# Patient Record
Sex: MALE | Race: WHITE | NOT HISPANIC OR LATINO | Employment: FULL TIME | URBAN - METROPOLITAN AREA
[De-identification: names, ages, dates, MRNs, and addresses within clinical notes are randomized per-mention and may not be internally consistent; named-entity substitution may affect disease eponyms.]

---

## 2017-06-22 ENCOUNTER — APPOINTMENT (OUTPATIENT)
Dept: LAB | Facility: HOSPITAL | Age: 37
End: 2017-06-22

## 2017-06-22 PROCEDURE — 80053 COMPREHEN METABOLIC PANEL: CPT | Performed by: NURSE PRACTITIONER

## 2017-06-22 PROCEDURE — 84443 ASSAY THYROID STIM HORMONE: CPT | Performed by: NURSE PRACTITIONER

## 2017-06-29 ENCOUNTER — LAB (OUTPATIENT)
Dept: LAB | Facility: HOSPITAL | Age: 37
End: 2017-06-29

## 2017-06-29 ENCOUNTER — TRANSCRIBE ORDERS (OUTPATIENT)
Dept: LAB | Facility: HOSPITAL | Age: 37
End: 2017-06-29

## 2017-06-29 DIAGNOSIS — Z51.81 ENCOUNTER FOR THERAPEUTIC DRUG MONITORING: ICD-10-CM

## 2017-06-29 DIAGNOSIS — E55.9 UNSPECIFIED VITAMIN D DEFICIENCY: ICD-10-CM

## 2017-06-29 DIAGNOSIS — Z00.00 ROUTINE GENERAL MEDICAL EXAMINATION AT A HEALTH CARE FACILITY: Primary | ICD-10-CM

## 2017-06-29 DIAGNOSIS — Z00.00 ROUTINE GENERAL MEDICAL EXAMINATION AT A HEALTH CARE FACILITY: ICD-10-CM

## 2017-06-29 LAB
25(OH)D3 SERPL-MCNC: 31 NG/ML
ARTICHOKE IGE QN: 163 MG/DL (ref 0–130)
CHOLEST SERPL-MCNC: 236 MG/DL (ref 0–200)
HDLC SERPL-MCNC: 52 MG/DL (ref 40–60)
TRIGL SERPL-MCNC: 95 MG/DL (ref 0–150)

## 2017-06-29 PROCEDURE — 36415 COLL VENOUS BLD VENIPUNCTURE: CPT

## 2017-06-29 PROCEDURE — 80061 LIPID PANEL: CPT | Performed by: NURSE PRACTITIONER

## 2017-06-29 PROCEDURE — 82306 VITAMIN D 25 HYDROXY: CPT | Performed by: NURSE PRACTITIONER

## 2017-07-24 ENCOUNTER — TELEPHONE (OUTPATIENT)
Dept: FAMILY MEDICINE CLINIC | Facility: CLINIC | Age: 37
End: 2017-07-24

## 2017-07-24 NOTE — TELEPHONE ENCOUNTER
NOTIFIED PATIENT PER PAPER CHART AND [facility] ARE ALL OK  CALLED ABOUT 2-D ECHO THAT WAS TO BE DONE AT DR TEJEDA  LEFT MESSAGE WITH SANDRO TO CHECK ON TEST    SANDRO NEED ORDERS AND INSURANCE TO BE REFAXED THEY HAD NEVER RECEIVED THEM  I REFAXED FROM PAPER CHART

## 2017-08-23 ENCOUNTER — TELEPHONE (OUTPATIENT)
Dept: FAMILY MEDICINE CLINIC | Facility: CLINIC | Age: 37
End: 2017-08-23

## 2017-08-23 NOTE — TELEPHONE ENCOUNTER
----- Message from NEELA Gonzalez sent at 8/23/2017  8:17 AM EDT -----  M[facility]ika, All labs or xrays are good. Please let patient know. Thanks!!          Called and left a message for patient to call the office back in regards to his recent Echo.

## 2017-08-25 NOTE — TELEPHONE ENCOUNTER
Spoke with patient told him that his Echo looked good. Mario Kern. Also told him that I would mail his labs to him. Patient understood.

## 2017-08-25 NOTE — TELEPHONE ENCOUNTER
CALLING BACK REGARDING THE ECO RESULTS.     WANTS A COPY OF BLOOD WORK FROM LAST VISIT. HAD ASKED IT A WHILE BACK BUT DID NOT RECEIVE IT.